# Patient Record
Sex: FEMALE | Race: OTHER | URBAN - METROPOLITAN AREA
[De-identification: names, ages, dates, MRNs, and addresses within clinical notes are randomized per-mention and may not be internally consistent; named-entity substitution may affect disease eponyms.]

---

## 2022-11-25 ENCOUNTER — HOSPITAL ENCOUNTER (INPATIENT)
Age: 27
LOS: 1 days | Discharge: ANOTHER HOSPITAL | DRG: 833 | End: 2022-11-25
Attending: OBSTETRICS & GYNECOLOGY | Admitting: OBSTETRICS & GYNECOLOGY

## 2022-11-25 VITALS
DIASTOLIC BLOOD PRESSURE: 65 MMHG | HEART RATE: 105 BPM | SYSTOLIC BLOOD PRESSURE: 102 MMHG | RESPIRATION RATE: 18 BRPM | TEMPERATURE: 97.9 F | WEIGHT: 139 LBS

## 2022-11-25 PROBLEM — O47.02 THREATENED PREMATURE LABOR AFFECTING PREGNANCY, LESS THAN 37 WEEKS IN SECOND TRIMESTER, ANTEPARTUM: Status: ACTIVE | Noted: 2022-11-25

## 2022-11-25 PROBLEM — O09.33 INSUFFICIENT PRENATAL CARE IN THIRD TRIMESTER: Status: ACTIVE | Noted: 2022-11-25

## 2022-11-25 PROBLEM — Z78.9 LANGUAGE BARRIER: Status: ACTIVE | Noted: 2022-11-25

## 2022-11-25 LAB
ABO/RH: NORMAL
AMPHETAMINE SCREEN, URINE: NORMAL
ANTIBODY SCREEN: NORMAL
BACTERIA: ABNORMAL /HPF
BARBITURATE SCREEN URINE: NORMAL
BASOPHILS ABSOLUTE: 0.1 K/UL (ref 0–0.2)
BASOPHILS RELATIVE PERCENT: 0.6 %
BENZODIAZEPINE SCREEN, URINE: NORMAL
BILIRUBIN URINE: NEGATIVE
BLOOD, URINE: NEGATIVE
BUPRENORPHINE URINE: NORMAL
CANNABINOID SCREEN URINE: NORMAL
CLARITY: ABNORMAL
COCAINE METABOLITE SCREEN URINE: NORMAL
COLOR: YELLOW
EOSINOPHILS ABSOLUTE: 0.2 K/UL (ref 0–0.6)
EOSINOPHILS RELATIVE PERCENT: 1.5 %
EPITHELIAL CELLS, UA: 10 /HPF (ref 0–5)
FENTANYL SCREEN, URINE: NORMAL
GLUCOSE URINE: NEGATIVE MG/DL
HCT VFR BLD CALC: 32.3 % (ref 36–48)
HEMOGLOBIN: 10.7 G/DL (ref 12–16)
HEPATITIS B SURFACE ANTIGEN INTERPRETATION: NORMAL
HEPATITIS C ANTIBODY INTERPRETATION: NORMAL
HYALINE CASTS: 0 /LPF (ref 0–8)
KETONES, URINE: >=160 MG/DL
LEUKOCYTE ESTERASE, URINE: ABNORMAL
LYMPHOCYTES ABSOLUTE: 3.6 K/UL (ref 1–5.1)
LYMPHOCYTES RELATIVE PERCENT: 27.6 %
Lab: NORMAL
MCH RBC QN AUTO: 27.7 PG (ref 26–34)
MCHC RBC AUTO-ENTMCNC: 33 G/DL (ref 31–36)
MCV RBC AUTO: 83.9 FL (ref 80–100)
METHADONE SCREEN, URINE: NORMAL
MICROSCOPIC EXAMINATION: YES
MONOCYTES ABSOLUTE: 1.1 K/UL (ref 0–1.3)
MONOCYTES RELATIVE PERCENT: 8.2 %
NEUTROPHILS ABSOLUTE: 8.2 K/UL (ref 1.7–7.7)
NEUTROPHILS RELATIVE PERCENT: 62.1 %
NITRITE, URINE: NEGATIVE
OPIATE SCREEN URINE: NORMAL
OXYCODONE URINE: NORMAL
PDW BLD-RTO: 15.4 % (ref 12.4–15.4)
PH UA: 6.5
PH UA: 6.5 (ref 5–8)
PHENCYCLIDINE SCREEN URINE: NORMAL
PLATELET # BLD: 470 K/UL (ref 135–450)
PMV BLD AUTO: 8.3 FL (ref 5–10.5)
PROTEIN UA: ABNORMAL MG/DL
RBC # BLD: 3.85 M/UL (ref 4–5.2)
RBC UA: 2 /HPF (ref 0–4)
RUBELLA ANTIBODY IGG: 6.4 IU/ML
SPECIFIC GRAVITY UA: 1.02 (ref 1–1.03)
URINE TYPE: ABNORMAL
UROBILINOGEN, URINE: 1 E.U./DL
WBC # BLD: 13.2 K/UL (ref 4–11)
WBC UA: 60 /HPF (ref 0–5)

## 2022-11-25 PROCEDURE — 87591 N.GONORRHOEAE DNA AMP PROB: CPT

## 2022-11-25 PROCEDURE — 86901 BLOOD TYPING SEROLOGIC RH(D): CPT

## 2022-11-25 PROCEDURE — 6360000002 HC RX W HCPCS: Performed by: OBSTETRICS & GYNECOLOGY

## 2022-11-25 PROCEDURE — 86850 RBC ANTIBODY SCREEN: CPT

## 2022-11-25 PROCEDURE — 2580000003 HC RX 258: Performed by: OBSTETRICS & GYNECOLOGY

## 2022-11-25 PROCEDURE — 86803 HEPATITIS C AB TEST: CPT

## 2022-11-25 PROCEDURE — 80307 DRUG TEST PRSMV CHEM ANLYZR: CPT

## 2022-11-25 PROCEDURE — 99211 OFF/OP EST MAY X REQ PHY/QHP: CPT

## 2022-11-25 PROCEDURE — 86701 HIV-1ANTIBODY: CPT

## 2022-11-25 PROCEDURE — 86762 RUBELLA ANTIBODY: CPT

## 2022-11-25 PROCEDURE — 96360 HYDRATION IV INFUSION INIT: CPT

## 2022-11-25 PROCEDURE — 85025 COMPLETE CBC W/AUTO DIFF WBC: CPT

## 2022-11-25 PROCEDURE — 59025 FETAL NON-STRESS TEST: CPT

## 2022-11-25 PROCEDURE — 86900 BLOOD TYPING SEROLOGIC ABO: CPT

## 2022-11-25 PROCEDURE — 86702 HIV-2 ANTIBODY: CPT

## 2022-11-25 PROCEDURE — 96361 HYDRATE IV INFUSION ADD-ON: CPT

## 2022-11-25 PROCEDURE — 1220000000 HC SEMI PRIVATE OB R&B

## 2022-11-25 PROCEDURE — 87340 HEPATITIS B SURFACE AG IA: CPT

## 2022-11-25 PROCEDURE — 87491 CHLMYD TRACH DNA AMP PROBE: CPT

## 2022-11-25 PROCEDURE — 81001 URINALYSIS AUTO W/SCOPE: CPT

## 2022-11-25 PROCEDURE — 86780 TREPONEMA PALLIDUM: CPT

## 2022-11-25 PROCEDURE — 87653 STREP B DNA AMP PROBE: CPT

## 2022-11-25 PROCEDURE — 87390 HIV-1 AG IA: CPT

## 2022-11-25 RX ORDER — BETAMETHASONE SODIUM PHOSPHATE AND BETAMETHASONE ACETATE 3; 3 MG/ML; MG/ML
12 INJECTION, SUSPENSION INTRA-ARTICULAR; INTRALESIONAL; INTRAMUSCULAR; SOFT TISSUE ONCE
Status: COMPLETED | OUTPATIENT
Start: 2022-11-25 | End: 2022-11-25

## 2022-11-25 RX ORDER — ONDANSETRON 4 MG/1
4 TABLET, ORALLY DISINTEGRATING ORAL EVERY 8 HOURS PRN
Status: DISCONTINUED | OUTPATIENT
Start: 2022-11-25 | End: 2022-11-25 | Stop reason: HOSPADM

## 2022-11-25 RX ORDER — TERBUTALINE SULFATE 1 MG/ML
0.25 INJECTION, SOLUTION SUBCUTANEOUS ONCE
Status: COMPLETED | OUTPATIENT
Start: 2022-11-25 | End: 2022-11-25

## 2022-11-25 RX ORDER — SODIUM CHLORIDE, SODIUM LACTATE, POTASSIUM CHLORIDE, AND CALCIUM CHLORIDE .6; .31; .03; .02 G/100ML; G/100ML; G/100ML; G/100ML
1000 INJECTION, SOLUTION INTRAVENOUS PRN
Status: DISCONTINUED | OUTPATIENT
Start: 2022-11-25 | End: 2022-11-25 | Stop reason: HOSPADM

## 2022-11-25 RX ORDER — SODIUM CHLORIDE, SODIUM LACTATE, POTASSIUM CHLORIDE, CALCIUM CHLORIDE 600; 310; 30; 20 MG/100ML; MG/100ML; MG/100ML; MG/100ML
INJECTION, SOLUTION INTRAVENOUS CONTINUOUS
Status: DISCONTINUED | OUTPATIENT
Start: 2022-11-25 | End: 2022-11-25 | Stop reason: SDUPTHER

## 2022-11-25 RX ORDER — SODIUM CHLORIDE, SODIUM LACTATE, POTASSIUM CHLORIDE, CALCIUM CHLORIDE 600; 310; 30; 20 MG/100ML; MG/100ML; MG/100ML; MG/100ML
INJECTION, SOLUTION INTRAVENOUS CONTINUOUS
Status: DISCONTINUED | OUTPATIENT
Start: 2022-11-25 | End: 2022-11-25 | Stop reason: HOSPADM

## 2022-11-25 RX ORDER — SODIUM CHLORIDE, SODIUM LACTATE, POTASSIUM CHLORIDE, AND CALCIUM CHLORIDE .6; .31; .03; .02 G/100ML; G/100ML; G/100ML; G/100ML
500 INJECTION, SOLUTION INTRAVENOUS PRN
Status: DISCONTINUED | OUTPATIENT
Start: 2022-11-25 | End: 2022-11-25 | Stop reason: HOSPADM

## 2022-11-25 RX ORDER — ACETAMINOPHEN 325 MG/1
650 TABLET ORAL EVERY 4 HOURS PRN
Status: DISCONTINUED | OUTPATIENT
Start: 2022-11-25 | End: 2022-11-25 | Stop reason: HOSPADM

## 2022-11-25 RX ORDER — TERBUTALINE SULFATE 1 MG/ML
0.25 INJECTION, SOLUTION SUBCUTANEOUS ONCE
Status: DISCONTINUED | OUTPATIENT
Start: 2022-11-25 | End: 2022-11-25 | Stop reason: SDUPTHER

## 2022-11-25 RX ORDER — ONDANSETRON 2 MG/ML
4 INJECTION INTRAMUSCULAR; INTRAVENOUS EVERY 6 HOURS PRN
Status: DISCONTINUED | OUTPATIENT
Start: 2022-11-25 | End: 2022-11-25 | Stop reason: HOSPADM

## 2022-11-25 RX ADMIN — Medication 12 MG: at 20:38

## 2022-11-25 RX ADMIN — SODIUM CHLORIDE, POTASSIUM CHLORIDE, SODIUM LACTATE AND CALCIUM CHLORIDE: 600; 310; 30; 20 INJECTION, SOLUTION INTRAVENOUS at 16:25

## 2022-11-25 RX ADMIN — TERBUTALINE SULFATE 0.25 MG: 1 INJECTION SUBCUTANEOUS at 16:27

## 2022-11-25 NOTE — H&P
Department of Obstetrics and Gynecology   Obstetrics History and Physical        CHIEF COMPLAINT:  seen at [facility] and was told the baby is coming    HISTORY OF PRESENT ILLNESS:      The patient is a 32 y.o. female at 35w2d. OB History          2    Para        Term                AB   1    Living             SAB   1    IAB        Ectopic        Molar        Multiple        Live Births                Patient presents with a chief complaint as above . She is a poor historian. She states the baby has a heart problem and is supposed to deliver in [place]. She does not know where she received prenatal care and no information is in Care Everywhere. Estimated Due Date: Estimated Date of Delivery: 23    PRENATAL CARE:    Complicated by: no prenatal records, suspect insufficient prenatal care. PAST OB HISTORY  OB History          2    Para        Term                AB   1    Living             SAB   1    IAB        Ectopic        Molar        Multiple        Live Births                    Past Medical History:    History reviewed. No pertinent past medical history. Past Surgical History:    History reviewed. No pertinent surgical history. Allergies:  Patient has no known allergies.   Social History:    Social History     Socioeconomic History    Marital status: Single     Spouse name: Not on file    Number of children: Not on file    Years of education: Not on file    Highest education level: Not on file   Occupational History    Not on file   Tobacco Use    Smoking status: Never     Passive exposure: Never    Smokeless tobacco: Never   Vaping Use    Vaping Use: Never used   Substance and Sexual Activity    Alcohol use: Never    Drug use: Never    Sexual activity: Yes   Other Topics Concern    Not on file   Social History Narrative    Not on file     Social Determinants of Health     Financial Resource Strain: Not on file   Food Insecurity: Not on file   Transportation Needs: Not on file   Physical Activity: Not on file   Stress: Not on file   Social Connections: Not on file   Intimate Partner Violence: Not on file   Housing Stability: Not on file     Family History:   History reviewed. No pertinent family history. Medications Prior to Admission:  Medications Prior to Admission: Prenatal MV-Min-Fe Fum-FA-DHA (PRENATAL 1 PO), Take 1 tablet by mouth daily    REVIEW OF SYSTEMS:    Constitutional: negative  Gastrointestinal: negative  Genitourinary:negative    PHYSICAL EXAM:  Vitals:    22 1508   BP: 102/60   Pulse: 90   Resp: 18   Temp: 97.9 °F (36.6 °C)   TempSrc: Oral       /60   Pulse 90   Temp 97.9 °F (36.6 °C) (Oral)   Resp 18   LMP 2022   General appearance: alert, appears stated age, and cooperative  Lungs:  normal respiratory effort  Abdomen: gravid uterus  Skin: Skin color, texture, turgor normal. No rashes or lesions    Fetal heart rate:  Reassuring. Pelvis:  Adequate pelvis  Cervix: 3 cm 80% soft -2  Contraction frequency:  irregular 3 min minutes    Membranes:  Intact    General Labs:          ASSESSMENT AND PLAN:  34w2d   dilation  GBBS, GC/CT collected. IVF, SQ Brethine. Start PCN G, Brethine  Looking for prenatal records. Will probably need to draw prenatal labs. Communications via live  which was difficult.  Pt very poor historian and unable to contribute any useful information because she does not know even the name of her clinc/doctor/hospital.     Admitted for Observation

## 2022-11-26 LAB
HIV AG/AB: NORMAL
HIV ANTIGEN: NORMAL
HIV-1 ANTIBODY: NORMAL
HIV-2 AB: NORMAL
TOTAL SYPHILLIS IGG/IGM: NORMAL

## 2022-11-26 NOTE — DISCHARGE SUMMARY
Obstetrical Discharge Form    Gestational Age: 34w2d    Antepartum complications: premature labor with tocolysis    Patient is a [place]ian speaking patient who is  with IUP at 34w2d with  contractions and dilation. GBBS, GC/CT collected. IVF, SQ Brethine. Prenatal records show Tetrology of Fallot and patient states that she is supposed to deliver at [facility].  She was examined and her cervix was 3/70/-2 and was initially contraction every 2-3 minutes. Her contractions have spaced out to every 10 minutes. Patient is still at risks of going into labor therefore decision was made to transfer her to [facility]    Date of Transfer: 22      Discharge Medication:      Medication List        ASK your doctor about these medications      PRENATAL 1 PO              Discharge Condition:  fair    Discharge Date: 2022    PLAN: Transfer to [facility] for further care    No discharge order was placed for discharge because she was never fully admitted.  She came into triage and was transferred to [facility].

## 2022-11-26 NOTE — FLOWSHEET NOTE
Patient transport left with patient in stable condition via ambulance to [facility]. Report called to Samantha SAHNI at [id number] at this time.

## 2022-11-26 NOTE — FLOWSHEET NOTE
Dr Diane Triana notified of prenatal records found. Notified that US results were showing possible tetralogy of falot for baby. Physician coming to nurses station.

## 2022-11-26 NOTE — FLOWSHEET NOTE
[facility] transport called and states they will be here around 2100 for transport to [facility].

## 2022-11-26 NOTE — PROGRESS NOTES
Department of Obstetrics and Gynecology   Progress Note      Date of Admission: 2022  Hospital Day: Hospital Day: 1      Subjective:  Patient states pain is better. She denies contractions, LOF, or VB. +FM. After brethine was given her contractions decreased. Her cervical exam did not change. Objective:   Vitals:    22 1508 22 1846 22 1945   BP: 102/60 102/65    Pulse: 90 (!) 105    Resp: 18     Temp: 97.9 °F (36.6 °C)  97.9 °F (36.6 °C)   TempSrc: Oral  Oral     GEN NAD  RRR CTAB  Abd nontender  SVE: deferred was 3/70/-2 on previous exam  NST: reactive  Contraction: irregular    Labs:  O+/-  HIV-  Hep B/C -  Rubella non-immune  Ucx- neg  Pap-LGSIL    Recent Labs     22  1645   WBC 13.2*   HGB 10.7*   HCT 32.3*   *         ASSESSMENT: 32 y.o.  @ 34w2d NAHOMI 22 by 15 week US with  contraction with cardiac anomaly- Tetrology of Fallot. Cervix 3/70/-2. Contractions spaced out after dose of brethine.  S/p IVF, Brethine, Cultures, [place]    PLAN:  Cont current plan  Transfer in stable condition to [facility] for further care  Spoke to Dr. Jama Riley at [facility]

## 2022-11-28 LAB
C TRACH DNA GENITAL QL NAA+PROBE: NEGATIVE
N. GONORRHOEAE DNA: NEGATIVE
STREP B DNA AMP: NORMAL